# Patient Record
Sex: MALE | Race: ASIAN | NOT HISPANIC OR LATINO
[De-identification: names, ages, dates, MRNs, and addresses within clinical notes are randomized per-mention and may not be internally consistent; named-entity substitution may affect disease eponyms.]

---

## 2021-11-01 PROBLEM — Z00.00 ENCOUNTER FOR PREVENTIVE HEALTH EXAMINATION: Status: ACTIVE | Noted: 2021-11-01

## 2021-11-02 ENCOUNTER — APPOINTMENT (OUTPATIENT)
Dept: ORTHOPEDIC SURGERY | Facility: CLINIC | Age: 31
End: 2021-11-02
Payer: COMMERCIAL

## 2021-11-02 ENCOUNTER — NON-APPOINTMENT (OUTPATIENT)
Age: 31
End: 2021-11-02

## 2021-11-02 PROCEDURE — 99204 OFFICE O/P NEW MOD 45 MIN: CPT

## 2021-11-02 NOTE — HISTORY OF PRESENT ILLNESS
[Stable] : stable [___ wks] : [unfilled] week(s) ago [3] : an average pain level of 3/10 [2] : a minimum pain level of 2/10 [4] : a maximum pain level of 4/10 [Bending] : worsened by bending [Rest] : relieved by rest [de-identified] : CELESTE ARNOLD is a 31 year male being seen for initial visit L knee pain. He reports SUZAN as falling while playing cricket on 09/18/2021. He reports he heard and felt a pop at the time of injury. He reports most pain with bending. He denies instability and painful clicking/popping. He denies use of OTC pain medications. He was ref. by Dr. Tipton at [facility] for surgical consultation. Patient presents with MRI of L knee performed at  on 10/23/2021. MRI reveals: \par \par Disorganized appearance of the anterior cruciate ligament, consistent with a high-grade partial versus complete tear. Bone marrow edema in a pattern consistent with a complete ACL tear. S83.512A\par \par Longitudinal vertical tear involving the outer third of the body and posterior horn medial meniscus. S83.222A\par \par Knee joint effusion. Popliteal cyst. M25.462\par

## 2021-11-02 NOTE — PHYSICAL EXAM
[de-identified] : Physical Exam:\par General: Well appearing, no acute distress\par Neurologic: A&Ox3, No focal deficits\par Head: NCAT without abrasions, lacerations, or ecchymosis to head, face, or scalp\par Eyes: No scleral icterus, no gross abnormalities\par Respiratory: Equal chest wall expansion bilaterally, no accessory muscle use\par Lymphatic: No lymphadenopathy palpated\par Skin: Warm and dry\par Psychiatric: Normal mood and affect\par \par \par Left knee:\par \par Inspection/Palpation: Gait evaluation does reveal a limp. There is no inguinal adenopathy. Limb is well-perfused, without skin lesions, shows a grossly normal motor and sensory examination. \par ROM: The Right knee motion is significantly reduced and does cause significant pain, 0-100. . The knee exhibits a moderate to severe effusion. \par Muscle/Nerves: Quad strength decreased secondary to injury. Motor exam 5/5 distally, EHL/FHL/GSC/TA\par SILT L4-S1\par \par Special Tests: \par Joint line tenderness noted medial joint line at 0 and 90 degrees. \par Stable in varus and valgus stress at 0 and 30 degrees\par Negative posterior drawer. positive Santiago test. \par The knee has a 2A Lachman and positive anterior drawer.\par Unable to ellicit a pivot shift secondary to pain.  \par Normal hip and ankle exam. \par \par Right Knee:\par \par Inspection/Palpation: There is no inguinal adenopathy. Well perfused, without skin lesions, shows a grossly normal motor and sensory examination. \par ROM: Normal\par Muscle/Nerves: Quad strength normal. Motor exam 5/5 distally, EHL/FHL/GSC/TA\par SILT L4-S1\par \par Special Tests: \par No Joint line tenderness noted at medial and lateral joint line at 0 and 90 degrees. \par Stable in varus and valgus stress at 0 and 30 degrees\par Negative posterior drawer. \par Negative anterior drawer and stable Lachman \par Normal hip and ankle exam  [de-identified] : Procedure: MRI of Left knee \par Dated: 10/23/21\par \par Impression:\par \par Disorganized appearance of the anterior cruciate ligament, consistent with a high-grade partial versus complete tear. Bone marrow edema in a pattern consistent with a complete ACL tear. S83.512A\par \par Longitudinal vertical tear involving the outer third of the body and posterior horn medial meniscus. S83.222A\par \par Knee joint effusion. Popliteal cyst. M25.462

## 2021-11-02 NOTE — ADDENDUM
[FreeTextEntry1] : Documented by Joss Wagner acting as a scribe for Dr. Do on 11/02/2021. \par \par All medical record entries made by the Scribe were at my, Dr. Do's, direction and\par personally dictated by me on 11/02/2021. I have reviewed the chart and agree that the record\par accurately reflects my personal performance of the history, physical exam, procedure and imaging.

## 2021-11-02 NOTE — DISCUSSION/SUMMARY
[de-identified] : CELESTE ARNOLD is a 31 year male being seen for initial visit L knee pain secondary to ACL and medial meniscus tear. He reports SUZAN as falling while playing cricket on 09/18/2021. He reports he heard and felt a pop at the time of injury. He reports most pain with bending. He denies instability and painful clicking/popping. He denies use of OTC pain medications. He was ref. by Dr. Tipton at [facility] for surgical consultation. Patient presents with MRI of L knee performed at  on 10/23/2021. \par \par We had a thorough discussion regarding the nature of his pain, the pathophysiology, as well as all treatment options. Given the nature of the injury, age and activity level of the patient, and risk for increased instability and potential for osteoarthritis, surgery is indicated. The patient understands that the common risks of an ACL reconstruction using quadriceps tendon, meniscus repair include infection, allergy to the anesthetic, re-rupture of the ligament and stiffness. If the range of motion does not progress properly, another arthroscopy and removal of scar may be necessary at 4-6 weeks. The patient accepts these risks. A packet was given to patient that describes pathophysiology, entire procedure, likely outcome, risks, and benefits, along with post operative physical therapy plan. I provided him contact number of my surgical coordinator David, who will go over dates for this procedure.  The pt understands continuation of PT until this procedure helps surgical outcomes as he is to continue doing this 2x/week until then. He agrees to the above plan and all questions were answered.\par \par

## 2021-11-18 ENCOUNTER — OUTPATIENT (OUTPATIENT)
Dept: OUTPATIENT SERVICES | Facility: HOSPITAL | Age: 31
LOS: 1 days | End: 2021-11-18
Payer: COMMERCIAL

## 2021-11-18 VITALS
RESPIRATION RATE: 16 BRPM | DIASTOLIC BLOOD PRESSURE: 71 MMHG | SYSTOLIC BLOOD PRESSURE: 126 MMHG | TEMPERATURE: 99 F | WEIGHT: 258.82 LBS | OXYGEN SATURATION: 100 % | HEIGHT: 71 IN | HEART RATE: 68 BPM

## 2021-11-18 DIAGNOSIS — S83.207A UNSPECIFIED TEAR OF UNSPECIFIED MENISCUS, CURRENT INJURY, LEFT KNEE, INITIAL ENCOUNTER: ICD-10-CM

## 2021-11-18 DIAGNOSIS — Z01.818 ENCOUNTER FOR OTHER PREPROCEDURAL EXAMINATION: ICD-10-CM

## 2021-11-18 LAB
ANION GAP SERPL CALC-SCNC: 6 MMOL/L — SIGNIFICANT CHANGE UP (ref 5–17)
APTT BLD: 32 SEC — SIGNIFICANT CHANGE UP (ref 27.5–35.5)
BASOPHILS # BLD AUTO: 0.05 K/UL — SIGNIFICANT CHANGE UP (ref 0–0.2)
BASOPHILS NFR BLD AUTO: 0.8 % — SIGNIFICANT CHANGE UP (ref 0–2)
BUN SERPL-MCNC: 12 MG/DL — SIGNIFICANT CHANGE UP (ref 7–23)
CALCIUM SERPL-MCNC: 9.4 MG/DL — SIGNIFICANT CHANGE UP (ref 8.5–10.1)
CHLORIDE SERPL-SCNC: 106 MMOL/L — SIGNIFICANT CHANGE UP (ref 96–108)
CO2 SERPL-SCNC: 26 MMOL/L — SIGNIFICANT CHANGE UP (ref 22–31)
CREAT SERPL-MCNC: 1.02 MG/DL — SIGNIFICANT CHANGE UP (ref 0.5–1.3)
EOSINOPHIL # BLD AUTO: 0.08 K/UL — SIGNIFICANT CHANGE UP (ref 0–0.5)
EOSINOPHIL NFR BLD AUTO: 1.2 % — SIGNIFICANT CHANGE UP (ref 0–6)
GLUCOSE SERPL-MCNC: 93 MG/DL — SIGNIFICANT CHANGE UP (ref 70–99)
HCT VFR BLD CALC: 48.7 % — SIGNIFICANT CHANGE UP (ref 39–50)
HGB BLD-MCNC: 15.9 G/DL — SIGNIFICANT CHANGE UP (ref 13–17)
IMM GRANULOCYTES NFR BLD AUTO: 0.3 % — SIGNIFICANT CHANGE UP (ref 0–1.5)
INR BLD: 1.04 RATIO — SIGNIFICANT CHANGE UP (ref 0.88–1.16)
LYMPHOCYTES # BLD AUTO: 2.24 K/UL — SIGNIFICANT CHANGE UP (ref 1–3.3)
LYMPHOCYTES # BLD AUTO: 34.3 % — SIGNIFICANT CHANGE UP (ref 13–44)
MCHC RBC-ENTMCNC: 26.9 PG — LOW (ref 27–34)
MCHC RBC-ENTMCNC: 32.6 GM/DL — SIGNIFICANT CHANGE UP (ref 32–36)
MCV RBC AUTO: 82.3 FL — SIGNIFICANT CHANGE UP (ref 80–100)
MONOCYTES # BLD AUTO: 0.52 K/UL — SIGNIFICANT CHANGE UP (ref 0–0.9)
MONOCYTES NFR BLD AUTO: 8 % — SIGNIFICANT CHANGE UP (ref 2–14)
NEUTROPHILS # BLD AUTO: 3.62 K/UL — SIGNIFICANT CHANGE UP (ref 1.8–7.4)
NEUTROPHILS NFR BLD AUTO: 55.4 % — SIGNIFICANT CHANGE UP (ref 43–77)
PLATELET # BLD AUTO: 224 K/UL — SIGNIFICANT CHANGE UP (ref 150–400)
POTASSIUM SERPL-MCNC: 3.7 MMOL/L — SIGNIFICANT CHANGE UP (ref 3.5–5.3)
POTASSIUM SERPL-SCNC: 3.7 MMOL/L — SIGNIFICANT CHANGE UP (ref 3.5–5.3)
PROTHROM AB SERPL-ACNC: 12.2 SEC — SIGNIFICANT CHANGE UP (ref 10.6–13.6)
RBC # BLD: 5.92 M/UL — HIGH (ref 4.2–5.8)
RBC # FLD: 13.1 % — SIGNIFICANT CHANGE UP (ref 10.3–14.5)
SODIUM SERPL-SCNC: 138 MMOL/L — SIGNIFICANT CHANGE UP (ref 135–145)
WBC # BLD: 6.53 K/UL — SIGNIFICANT CHANGE UP (ref 3.8–10.5)
WBC # FLD AUTO: 6.53 K/UL — SIGNIFICANT CHANGE UP (ref 3.8–10.5)

## 2021-11-18 PROCEDURE — 93005 ELECTROCARDIOGRAM TRACING: CPT

## 2021-11-18 PROCEDURE — 85730 THROMBOPLASTIN TIME PARTIAL: CPT

## 2021-11-18 PROCEDURE — 85025 COMPLETE CBC W/AUTO DIFF WBC: CPT

## 2021-11-18 PROCEDURE — G0463: CPT | Mod: 25

## 2021-11-18 PROCEDURE — 80048 BASIC METABOLIC PNL TOTAL CA: CPT

## 2021-11-18 PROCEDURE — 93010 ELECTROCARDIOGRAM REPORT: CPT

## 2021-11-18 PROCEDURE — 85610 PROTHROMBIN TIME: CPT

## 2021-11-18 NOTE — H&P PST ADULT - PATIENT ON (OXYGEN DELIVERY METHOD)
______________________________________________________________________________   

  

[id number] RAD/RAD Chest PA or AP 1V  

EXAM:  RAD Chest PA or AP 1V  

   

 INDICATION:  CHEST PAIN.  

   

 COMPARISON:  2014.  

   

 DISCUSSION:    

   

 Cardiomediastinal silhouette is normal in size and contour.  

   

 No infiltrate, effusion, pneumothorax, or edema.  

   

 IMPRESSION:  

 No acute findings or significant change from prior examination in 2014.  

   

 Electronically signed by Cyrus Srivastava MD on 12/16/2019 3:14 PM  

   

  

Cyrus Srivastava MD                 

 12/16/19 8025    

  

Thank you for allowing us to participate in the care of your patient. room air

## 2021-11-18 NOTE — H&P PST ADULT - HISTORY OF PRESENT ILLNESS
31 year old male with torn meniscus and torn ACL left knee; Pt said he injured his left knee while playing cricket 9/18/2021; c/o pain with bending and brisk walking; He presents to PST for planned Left knee arthroscopy ACL reconstruction with quadriceps tendon autograft medial meniscus repair

## 2021-11-18 NOTE — H&P PST ADULT - HEALTH CARE MAINTENANCE
Pt denies travel out of [facility] for the past 14 days Pt denies  travel internationally for the past 21 days

## 2021-11-18 NOTE — H&P PST ADULT - ACTIVITY
February 6, 2020       Gris Arora MD  [address] [facility] [address]  VIA In Basket      Patient: Dodie Douglas   YOB: 1946   Date of Visit: 2/6/2020       Dear Dr. Arora:    Thank you for referring Dodie Douglas to me for evaluation. Below are my notes for this visit with her.    If you have questions, please do not hesitate to call me. I look forward to following your patient along with you.      Sincerely,        Enriqueta Garibay RD        CC: No Recipients               can walk 1-2 blocks ADL

## 2021-11-18 NOTE — H&P PST ADULT - NSANTHOSAYNRD_GEN_A_CORE
No. JELENA screening performed.  STOP BANG Legend: 0-2 = LOW Risk; 3-4 = INTERMEDIATE Risk; 5-8 = HIGH Risk

## 2021-11-18 NOTE — H&P PST ADULT - ASSESSMENT
31 year old male with torn meniscus and torn ACL left knee; Pt said he injured his left knee while playing cricket 9/18/2021; c/o pain with bending and brisk walking; He presents to PST for planned Left knee arthroscopy ACL reconstruction with quadriceps tendon autograft medial meniscus repair     Plan:  1. PST instructions given ; NPO status instructions to be given by ASU   2. Pt instructed to take following meds with sip of water : none  3. Pt instructed to take routine evening medications unless indicated   4. Stop NSAIDS ( Aspirin Alev Motrin Mobic Diclofenac), herbal supplements , MVI , Vitamin fish oil 7 days prior to surgery  unless   directed by surgeon or cardiologist;   5. Medical Optimization  not indicated   6. EZ wash instructions given   7. Labs EKG  as per surgeon request   8. Pt instructed to self quarantine after Covid test   9. Covid Testing scheduled Pt notified and aware  10. Pt denies covid symptoms shortness of breath fever cough

## 2021-11-18 NOTE — H&P PST ADULT - ATTENDING COMMENTS
Orthopedic Sports Attending:    Agree with above resident/PA note.  Note edited where necessary.      Patient seen and examined at bedside. Discussed the risks, complications, benefits and alternatives of care. Confirmed procedure and site. Patient fully understands and would like to proceed with surgery.    Andres Do, DO  Orthopaedic Surgery

## 2021-11-18 NOTE — H&P PST ADULT - DOCUMENT STATUS
Right superior pubic rami fx  Pain control as above   No acute surgical intervention   PT. Authored by Resident/PA/NP

## 2021-11-19 DIAGNOSIS — Z01.818 ENCOUNTER FOR OTHER PREPROCEDURAL EXAMINATION: ICD-10-CM

## 2021-11-19 DIAGNOSIS — S83.207A UNSPECIFIED TEAR OF UNSPECIFIED MENISCUS, CURRENT INJURY, LEFT KNEE, INITIAL ENCOUNTER: ICD-10-CM

## 2021-11-19 RX ORDER — SODIUM CHLORIDE 9 MG/ML
3 INJECTION INTRAMUSCULAR; INTRAVENOUS; SUBCUTANEOUS EVERY 8 HOURS
Refills: 0 | Status: DISCONTINUED | OUTPATIENT
Start: 2021-11-22 | End: 2021-11-22

## 2021-11-22 ENCOUNTER — OUTPATIENT (OUTPATIENT)
Dept: INPATIENT UNIT | Facility: HOSPITAL | Age: 31
LOS: 1 days | Discharge: HOME | End: 2021-11-22
Payer: COMMERCIAL

## 2021-11-22 ENCOUNTER — APPOINTMENT (OUTPATIENT)
Dept: ORTHOPEDIC SURGERY | Facility: HOSPITAL | Age: 31
End: 2021-11-22

## 2021-11-22 VITALS
HEIGHT: 71 IN | HEART RATE: 64 BPM | TEMPERATURE: 98 F | DIASTOLIC BLOOD PRESSURE: 63 MMHG | SYSTOLIC BLOOD PRESSURE: 160 MMHG | RESPIRATION RATE: 16 BRPM | OXYGEN SATURATION: 100 % | WEIGHT: 261.03 LBS

## 2021-11-22 VITALS
TEMPERATURE: 99 F | RESPIRATION RATE: 16 BRPM | DIASTOLIC BLOOD PRESSURE: 86 MMHG | SYSTOLIC BLOOD PRESSURE: 153 MMHG | OXYGEN SATURATION: 98 % | HEART RATE: 100 BPM

## 2021-11-22 DIAGNOSIS — S83.207A UNSPECIFIED TEAR OF UNSPECIFIED MENISCUS, CURRENT INJURY, LEFT KNEE, INITIAL ENCOUNTER: ICD-10-CM

## 2021-11-22 PROCEDURE — 29882 ARTHRS KNE SRG MNISC RPR M/L: CPT | Mod: LT

## 2021-11-22 PROCEDURE — 29888 ARTHRS AID ACL RPR/AGMNTJ: CPT | Mod: LT

## 2021-11-22 PROCEDURE — C1713: CPT

## 2021-11-22 PROCEDURE — C9399: CPT

## 2021-11-22 RX ORDER — ACETAMINOPHEN 500 MG
975 TABLET ORAL ONCE
Refills: 0 | Status: COMPLETED | OUTPATIENT
Start: 2021-11-22 | End: 2021-11-22

## 2021-11-22 RX ORDER — ONDANSETRON 8 MG/1
4 TABLET, FILM COATED ORAL ONCE
Refills: 0 | Status: DISCONTINUED | OUTPATIENT
Start: 2021-11-22 | End: 2021-11-22

## 2021-11-22 RX ORDER — ONDANSETRON 8 MG/1
1 TABLET, FILM COATED ORAL
Qty: 21 | Refills: 0
Start: 2021-11-22 | End: 2021-11-28

## 2021-11-22 RX ORDER — FAMOTIDINE 10 MG/ML
20 INJECTION INTRAVENOUS ONCE
Refills: 0 | Status: COMPLETED | OUTPATIENT
Start: 2021-11-22 | End: 2021-11-22

## 2021-11-22 RX ORDER — DOCUSATE SODIUM 100 MG
1 CAPSULE ORAL
Qty: 14 | Refills: 0
Start: 2021-11-22 | End: 2021-11-28

## 2021-11-22 RX ORDER — ASPIRIN/CALCIUM CARB/MAGNESIUM 324 MG
1 TABLET ORAL
Qty: 30 | Refills: 0
Start: 2021-11-22 | End: 2021-12-21

## 2021-11-22 RX ORDER — OXYCODONE HYDROCHLORIDE 5 MG/1
10 TABLET ORAL ONCE
Refills: 0 | Status: DISCONTINUED | OUTPATIENT
Start: 2021-11-22 | End: 2021-11-22

## 2021-11-22 RX ORDER — CHOLECALCIFEROL (VITAMIN D3) 125 MCG
1 CAPSULE ORAL
Qty: 0 | Refills: 0 | DISCHARGE

## 2021-11-22 RX ORDER — FENTANYL CITRATE 50 UG/ML
50 INJECTION INTRAVENOUS
Refills: 0 | Status: DISCONTINUED | OUTPATIENT
Start: 2021-11-22 | End: 2021-11-22

## 2021-11-22 RX ORDER — SODIUM CHLORIDE 9 MG/ML
1000 INJECTION, SOLUTION INTRAVENOUS
Refills: 0 | Status: DISCONTINUED | OUTPATIENT
Start: 2021-11-22 | End: 2021-11-22

## 2021-11-22 RX ORDER — HYDROMORPHONE HYDROCHLORIDE 2 MG/ML
0.5 INJECTION INTRAMUSCULAR; INTRAVENOUS; SUBCUTANEOUS
Refills: 0 | Status: DISCONTINUED | OUTPATIENT
Start: 2021-11-22 | End: 2021-11-22

## 2021-11-22 RX ORDER — OXYCODONE HYDROCHLORIDE 5 MG/1
1 TABLET ORAL
Qty: 28 | Refills: 0
Start: 2021-11-22 | End: 2021-11-28

## 2021-11-22 RX ADMIN — FAMOTIDINE 20 MILLIGRAM(S): 10 INJECTION INTRAVENOUS at 11:23

## 2021-11-22 RX ADMIN — OXYCODONE HYDROCHLORIDE 10 MILLIGRAM(S): 5 TABLET ORAL at 17:55

## 2021-11-22 RX ADMIN — Medication 975 MILLIGRAM(S): at 11:23

## 2021-11-22 RX ADMIN — FENTANYL CITRATE 50 MICROGRAM(S): 50 INJECTION INTRAVENOUS at 17:30

## 2021-11-22 RX ADMIN — FENTANYL CITRATE 50 MICROGRAM(S): 50 INJECTION INTRAVENOUS at 18:01

## 2021-11-22 NOTE — ASU PATIENT PROFILE, ADULT - NSICDXPASTMEDICALHX_GEN_ALL_CORE_FT
PAST MEDICAL HISTORY:  COVID-19 virus infection 2020    Torn meniscus torn ACL left knee    Vitamin D deficiency

## 2021-11-22 NOTE — ASU DISCHARGE PLAN (ADULT/PEDIATRIC) - CARE PROVIDER_API CALL
Andres Do (DO)  [address], [address]  [place] [address]  Phone: [phone]  Fax: [phone]  Follow Up Time:

## 2021-11-24 NOTE — ASU DISCHARGE PLAN (ADULT/PEDIATRIC) - PATIENT BELONGINGS
Bedside shift change report given to [employer] (oncoming nurse) by Frank Herring (offgoing nurse). Report included the following information SBAR, Kardex, Intake/Output, MAR and Recent Results. Patient's belongings returned

## 2021-11-30 DIAGNOSIS — S83.512A SPRAIN OF ANTERIOR CRUCIATE LIGAMENT OF LEFT KNEE, INITIAL ENCOUNTER: ICD-10-CM

## 2021-11-30 DIAGNOSIS — Z86.16 PERSONAL HISTORY OF COVID-19: ICD-10-CM
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
## 2021-11-30 DIAGNOSIS — S83.242A OTHER TEAR OF MEDIAL MENISCUS, CURRENT INJURY, LEFT KNEE, INITIAL ENCOUNTER: ICD-10-CM

## 2021-11-30 DIAGNOSIS — F17.210 NICOTINE DEPENDENCE, CIGARETTES, UNCOMPLICATED: ICD-10-CM
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
## 2021-12-01 RX ORDER — CEPHALEXIN 500 MG/1
500 CAPSULE ORAL 3 TIMES DAILY
Qty: 21 | Refills: 0 | Status: ACTIVE | COMMUNITY
Start: 2021-12-01 | End: 1900-01-01

## 2021-12-06 ENCOUNTER — APPOINTMENT (OUTPATIENT)
Dept: ORTHOPEDIC SURGERY | Facility: CLINIC | Age: 31
End: 2021-12-06
Payer: COMMERCIAL

## 2021-12-06 VITALS
DIASTOLIC BLOOD PRESSURE: 92 MMHG | HEART RATE: 118 BPM | WEIGHT: 251 LBS | BODY MASS INDEX: 35.14 KG/M2 | HEIGHT: 71 IN | SYSTOLIC BLOOD PRESSURE: 141 MMHG

## 2021-12-06 DIAGNOSIS — Z98.890 OTHER SPECIFIED POSTPROCEDURAL STATES: ICD-10-CM

## 2021-12-06 PROCEDURE — 73560 X-RAY EXAM OF KNEE 1 OR 2: CPT | Mod: LT

## 2021-12-06 PROCEDURE — 99024 POSTOP FOLLOW-UP VISIT: CPT

## 2021-12-06 RX ORDER — OXYCODONE 5 MG/1
5 TABLET ORAL
Qty: 28 | Refills: 0 | Status: ACTIVE | COMMUNITY
Start: 2021-11-22

## 2021-12-06 RX ORDER — ERGOCALCIFEROL 1.25 MG/1
1.25 MG CAPSULE, LIQUID FILLED ORAL
Qty: 4 | Refills: 0 | Status: ACTIVE | COMMUNITY
Start: 2021-07-01

## 2021-12-06 RX ORDER — ASPIRIN 325 MG/1
325 TABLET, COATED ORAL
Qty: 30 | Refills: 0 | Status: ACTIVE | COMMUNITY
Start: 2021-11-22

## 2021-12-06 RX ORDER — BETAMETHASONE DIPROPIONATE 0.5 MG/G
0.05 LOTION TOPICAL
Qty: 60 | Refills: 0 | Status: ACTIVE | COMMUNITY
Start: 2021-06-17

## 2021-12-06 RX ORDER — KETOCONAZOLE 20.5 MG/ML
2 SHAMPOO, SUSPENSION TOPICAL
Qty: 120 | Refills: 0 | Status: ACTIVE | COMMUNITY
Start: 2021-06-17

## 2021-12-06 NOTE — HISTORY OF PRESENT ILLNESS
[Excellent Pain Control] : has excellent pain control [Chills] : no chills [Fever] : no fever [Nausea] : no nausea [Vomiting] : no vomiting [Erythema] : not erythematous [Discharge] : absent of discharge [Dehiscence] : not dehisced [de-identified] : spo L knee arthroscopy ACL reconstruction with quad tendon autograft, medial meniscus . DOS:  [de-identified] : CELESTE ARNOLD is a 31 year male being seen for first post op L knee arthroscopy ACL reconstruction with quad tendon autograft, medial meniscus, days ago. CELESET presents today wearing stanislav locked in extension. He denies fever chills, redness around/near incision site and numbness/tingling. He denies nausea/vomiting and admits to their appetite being back to normal since surgery. Patient has been utilizing Tylenol for pain with relief. They longer require narcotics. Patient reports starting physical therapy. \par  [de-identified] : Left Knee\par \par there is moderate effusion without warmth and mild ecchymosis throughout the leg. Knee motion is +3 - 70 degrees of passive ROM, straight leg raise with extension lag and pain free. Weakened quad strength. Full sensation intact and dorsalis pedis pulses 2+.\par \par Straight leg raise with lag\par \par Special Tests: NEG Lachman, NEG anterior drawer, NEG posterior drawer with collateral testing and varus/valgus normal.\par \par NEG Homans, no calf tenderness, soft and compressible  [de-identified] : 3 views of the Left knee were obtained today that demonstrate the fixation sites are stable. The hardware and tunnel are in good position without fracture or dislocation. There is no malalignment. No obvious osseous abnormality. Otherwise unremarkable.  [de-identified] : CELESTE is a 31 year male who is doing extremely well and is performing PT 2x/week without complaints. Surgical sites are clean and dry without warmth or erythema, pt denies fever or chills. He is to continue PT for 4 more weeks until we see him again for re-assessment at the 2nd post-op appointment. May continue to use tylenol prn for pain. Due to lag on exam today he is to continue with her knee immobilizer in full extension with all weightbearing activities. He may unlock it up to 90 degrees with nonweightbearing activity and will progress with PT as they feel necessary. He will transition from crutches in about a week. He agrees to the latter plan and does not have any further questions or concerns.

## 2021-12-06 NOTE — ADDENDUM
[FreeTextEntry1] : Documented by Joss Wagner acting as a scribe for Dr. Do on 12/06/2021. \par \par All medical record entries made by the Scribe were at my, Dr. Do's, direction and\par personally dictated by me on 12/06/2021. I have reviewed the chart and agree that the record\par accurately reflects my personal performance of the history, physical exam, procedure and imaging.

## 2021-12-07 PROBLEM — U07.1 COVID-19: Chronic | Status: ACTIVE | Noted: 2021-11-18

## 2021-12-07 PROBLEM — E55.9 VITAMIN D DEFICIENCY, UNSPECIFIED: Chronic | Status: ACTIVE | Noted: 2021-11-22

## 2021-12-07 PROBLEM — S83.209A UNSPECIFIED TEAR OF UNSPECIFIED MENISCUS, CURRENT INJURY, UNSPECIFIED KNEE, INITIAL ENCOUNTER: Chronic | Status: ACTIVE | Noted: 2021-11-18

## 2022-01-03 ENCOUNTER — APPOINTMENT (OUTPATIENT)
Dept: ORTHOPEDIC SURGERY | Facility: CLINIC | Age: 32
End: 2022-01-03
Payer: COMMERCIAL

## 2022-01-03 VITALS
SYSTOLIC BLOOD PRESSURE: 149 MMHG | HEIGHT: 71 IN | BODY MASS INDEX: 35.14 KG/M2 | WEIGHT: 251 LBS | DIASTOLIC BLOOD PRESSURE: 101 MMHG | HEART RATE: 82 BPM

## 2022-01-03 PROCEDURE — 99024 POSTOP FOLLOW-UP VISIT: CPT

## 2022-01-03 NOTE — HISTORY OF PRESENT ILLNESS
[Clean/Dry/Intact] : clean, dry and intact [Neuro Intact] : an unremarkable neurological exam [Vascular Intact] : ~T peripheral vascular exam normal [Doing Well] : is doing well [Excellent Pain Control] : has excellent pain control [___ Weeks Post Op] : [unfilled] weeks post op [0] : no pain reported [Chills] : no chills [Fever] : no fever [Nausea] : no nausea [Vomiting] : no vomiting [Erythema] : not erythematous [Discharge] : absent of discharge [Dehiscence] : not dehisced [de-identified] : spo L knee arthroscopy ACL reconstruction with quad tendon autograft, medial meniscus . DOS: 11/22/21 [de-identified] : CELESTE ARNOLD is a 31 year male being seen for first post op L knee arthroscopy ACL reconstruction with quad tendon autograft, medial meniscus, 6 wks ago. CELESTE presents today without stanislav. He denies fever chills, redness around/near incision site and numbness/tingling. He denies nausea/vomiting and admits to their appetite being back to normal since surgery. Patient has been utilizing Tylenol for pain with relief. They longer require narcotics. Patient reports going to physical therapy at [facility] in [place]. \par  [de-identified] : Left Knee\par \par there is mild to no effusion without warmth and no ecchymosis throughout the leg. Knee motion is 0- 120 degrees of passive ROM, straight leg raise without extension lag and pain free. Good quad strength. Full sensation intact and dorsalis pedis pulses 2+.\par \par Straight leg raise without lag\par \par Special Tests: NEG Lachman, NEG anterior drawer, NEG posterior drawer with collateral testing and varus/valgus normal.\par \par NEG Homans, no calf tenderness, soft and compressible  [de-identified] : No new imaging performed today.  [de-identified] : CELESTE is a 31 year male who is doing extremely well and is performing PT 2x/week without complaints. Surgical sites are clean and dry without warmth or erythema, pt denies fever or chills. He is to continue PT for 6 more weeks until we see him again for re-assessment at the 3rd post-op appointment. May continue to use tylenol prn for pain. He agrees to the latter plan and does not have any further questions or concerns.

## 2022-01-03 NOTE — ADDENDUM
[FreeTextEntry1] : Documented by Joss Wagner acting as a scribe for Dr. Do on 01/03/2022. \par \par All medical record entries made by the Scribe were at my, Dr. Do's, direction and\par personally dictated by me on 01/03/2022. I have reviewed the chart and agree that the record\par accurately reflects my personal performance of the history, physical exam, procedure and imaging.

## 2022-01-19 RX ORDER — TRAMADOL HYDROCHLORIDE 50 MG/1
50 TABLET, COATED ORAL EVERY 6 HOURS
Qty: 40 | Refills: 0 | Status: ACTIVE | COMMUNITY
Start: 2021-12-01 | End: 1900-01-01

## 2022-02-14 ENCOUNTER — APPOINTMENT (OUTPATIENT)
Dept: ORTHOPEDIC SURGERY | Facility: CLINIC | Age: 32
End: 2022-02-14
Payer: SELF-PAY

## 2022-02-14 VITALS
SYSTOLIC BLOOD PRESSURE: 137 MMHG | WEIGHT: 251 LBS | HEART RATE: 59 BPM | HEIGHT: 71 IN | BODY MASS INDEX: 35.14 KG/M2 | DIASTOLIC BLOOD PRESSURE: 83 MMHG

## 2022-02-14 DIAGNOSIS — S83.207A UNSPECIFIED TEAR OF UNSPECIFIED MENISCUS, CURRENT INJURY, LEFT KNEE, INITIAL ENCOUNTER: ICD-10-CM

## 2022-02-14 DIAGNOSIS — Z98.890 OTHER SPECIFIED POSTPROCEDURAL STATES: ICD-10-CM

## 2022-02-14 DIAGNOSIS — S83.512A UNSPECIFIED TEAR OF UNSPECIFIED MENISCUS, CURRENT INJURY, LEFT KNEE, INITIAL ENCOUNTER: ICD-10-CM

## 2022-02-14 PROCEDURE — 99024 POSTOP FOLLOW-UP VISIT: CPT

## 2022-02-14 NOTE — HISTORY OF PRESENT ILLNESS
[___ Weeks Post Op] : [unfilled] weeks post op [0] : no pain reported [Clean/Dry/Intact] : clean, dry and intact [Neuro Intact] : an unremarkable neurological exam [Vascular Intact] : ~T peripheral vascular exam normal [Doing Well] : is doing well [Excellent Pain Control] : has excellent pain control [Healed] : healed [Chills] : no chills [Fever] : no fever [Nausea] : no nausea [Vomiting] : no vomiting [Erythema] : not erythematous [Discharge] : absent of discharge [Dehiscence] : not dehisced [de-identified] : spo L knee arthroscopy ACL reconstruction with quad tendon autograft, medial meniscus . DOS: 11/22/21 [de-identified] : CELESTE ARNOLD is a 31 year male being seen for post op L knee arthroscopy ACL reconstruction with quad tendon autograft, medial meniscus, 12 wks ago. He denies fever chills, redness around/near incision site and numbness/tingling. He denies nausea/vomiting and admits to their appetite being back to normal since surgery. Patient has been utilizing Tylenol for pain with relief. They longer require narcotics. Patient reports going to physical therapy at [facility] in [place]. \par  [de-identified] : Left Knee\par \par there is no effusion without warmth and no ecchymosis throughout the leg. Knee motion is 0- 135 degrees of passive ROM, straight leg raise without extension lag and pain free. Good quad strength. Full sensation intact and dorsalis pedis pulses 2+.\par \par Straight leg raise without lag\par \par Special Tests: NEG Lachman, NEG anterior drawer, NEG posterior drawer with collateral testing and varus/valgus normal.\par \par NEG Homans, no calf tenderness, soft and compressible  [de-identified] : No new imaging performed today.  [de-identified] : CELESTE is a 31 year male who is doing extremely well and is performing PT 2x/week without complaints. Surgical sites are clean and dry without warmth or erythema, pt denies fever or chills. He is to continue PT for 3 more months. He is noticing mild effusion which is as expected at this time. He will continue HEP and PT. Conservative measures of treatment include rest until asymptomatic, activity avoidance, NSAID's PRN, application to ice to the area 2-3x daily for 20 minutes, with gradual return to activities.  May continue to use tylenol prn for pain. Patient will follow up in 3 months for repeat clinical assessment. He will start agility training at this time. A return to play brace was ordered today. He agrees to the latter plan and does not have any further questions or concerns.

## 2022-02-14 NOTE — ADDENDUM
[FreeTextEntry1] : Documented by Joss Wagner acting as a scribe for Dr. Do on 02/14/2022. \par \par All medical record entries made by the Scribe were at my, Dr. Do's, direction and\par personally dictated by me on 02/14/2022. I have reviewed the chart and agree that the record\par accurately reflects my personal performance of the history, physical exam, procedure and imaging.

## 2022-05-02 ENCOUNTER — APPOINTMENT (OUTPATIENT)
Dept: ORTHOPEDIC SURGERY | Facility: CLINIC | Age: 32
End: 2022-05-02
